# Patient Record
Sex: FEMALE | Race: WHITE | NOT HISPANIC OR LATINO
[De-identification: names, ages, dates, MRNs, and addresses within clinical notes are randomized per-mention and may not be internally consistent; named-entity substitution may affect disease eponyms.]

---

## 2017-02-13 PROBLEM — Z00.00 ENCOUNTER FOR PREVENTIVE HEALTH EXAMINATION: Status: ACTIVE | Noted: 2017-02-13

## 2018-08-28 ENCOUNTER — RESULT REVIEW (OUTPATIENT)
Age: 63
End: 2018-08-28

## 2020-01-15 ENCOUNTER — TRANSCRIPTION ENCOUNTER (OUTPATIENT)
Age: 65
End: 2020-01-15

## 2020-08-13 RX ORDER — AZITHROMYCIN 500 MG/1
0 TABLET, FILM COATED ORAL
Qty: 0 | Refills: 0 | DISCHARGE
Start: 2020-08-13 | End: 2020-08-18

## 2020-08-21 ENCOUNTER — APPOINTMENT (OUTPATIENT)
Dept: CT IMAGING | Facility: CLINIC | Age: 65
End: 2020-08-21
Payer: MEDICARE

## 2020-08-21 ENCOUNTER — TRANSCRIPTION ENCOUNTER (OUTPATIENT)
Age: 65
End: 2020-08-21

## 2020-08-21 ENCOUNTER — OUTPATIENT (OUTPATIENT)
Dept: OUTPATIENT SERVICES | Facility: HOSPITAL | Age: 65
LOS: 1 days | End: 2020-08-21
Payer: MEDICARE

## 2020-08-21 DIAGNOSIS — R22.0 LOCALIZED SWELLING, MASS AND LUMP, HEAD: ICD-10-CM

## 2020-08-21 PROCEDURE — 70487 CT MAXILLOFACIAL W/DYE: CPT | Mod: 26

## 2020-08-21 PROCEDURE — 70487 CT MAXILLOFACIAL W/DYE: CPT

## 2020-08-21 PROCEDURE — 82565 ASSAY OF CREATININE: CPT

## 2020-08-24 ENCOUNTER — INPATIENT (INPATIENT)
Facility: HOSPITAL | Age: 65
LOS: 1 days | Discharge: HOME | DRG: 158 | End: 2020-08-26
Attending: HOSPITALIST | Admitting: HOSPITALIST
Payer: MEDICARE

## 2020-08-24 VITALS
SYSTOLIC BLOOD PRESSURE: 150 MMHG | DIASTOLIC BLOOD PRESSURE: 81 MMHG | RESPIRATION RATE: 18 BRPM | OXYGEN SATURATION: 98 % | HEART RATE: 67 BPM | TEMPERATURE: 98 F

## 2020-08-24 DIAGNOSIS — M27.2 INFLAMMATORY CONDITIONS OF JAWS: ICD-10-CM

## 2020-08-24 DIAGNOSIS — Z88.2 ALLERGY STATUS TO SULFONAMIDES: ICD-10-CM

## 2020-08-24 LAB
ALBUMIN SERPL ELPH-MCNC: 4.1 G/DL — SIGNIFICANT CHANGE UP (ref 3.3–5)
ALP SERPL-CCNC: 91 U/L — SIGNIFICANT CHANGE UP (ref 40–120)
ALT FLD-CCNC: 28 U/L — SIGNIFICANT CHANGE UP (ref 12–78)
ANION GAP SERPL CALC-SCNC: 3 MMOL/L — LOW (ref 5–17)
AST SERPL-CCNC: 20 U/L — SIGNIFICANT CHANGE UP (ref 15–37)
BASOPHILS # BLD AUTO: 0.04 K/UL — SIGNIFICANT CHANGE UP (ref 0–0.2)
BASOPHILS NFR BLD AUTO: 0.5 % — SIGNIFICANT CHANGE UP (ref 0–2)
BILIRUB SERPL-MCNC: 0.3 MG/DL — SIGNIFICANT CHANGE UP (ref 0.2–1.2)
BUN SERPL-MCNC: 21 MG/DL — SIGNIFICANT CHANGE UP (ref 7–23)
CALCIUM SERPL-MCNC: 10.3 MG/DL — HIGH (ref 8.5–10.1)
CHLORIDE SERPL-SCNC: 106 MMOL/L — SIGNIFICANT CHANGE UP (ref 96–108)
CO2 SERPL-SCNC: 29 MMOL/L — SIGNIFICANT CHANGE UP (ref 22–31)
CREAT SERPL-MCNC: 0.83 MG/DL — SIGNIFICANT CHANGE UP (ref 0.5–1.3)
EOSINOPHIL # BLD AUTO: 0.17 K/UL — SIGNIFICANT CHANGE UP (ref 0–0.5)
EOSINOPHIL NFR BLD AUTO: 1.9 % — SIGNIFICANT CHANGE UP (ref 0–6)
ERYTHROCYTE [SEDIMENTATION RATE] IN BLOOD: 27 MM/HR — HIGH (ref 0–20)
GLUCOSE SERPL-MCNC: 101 MG/DL — HIGH (ref 70–99)
HCT VFR BLD CALC: 40.4 % — SIGNIFICANT CHANGE UP (ref 34.5–45)
HGB BLD-MCNC: 12.9 G/DL — SIGNIFICANT CHANGE UP (ref 11.5–15.5)
IMM GRANULOCYTES NFR BLD AUTO: 0.3 % — SIGNIFICANT CHANGE UP (ref 0–1.5)
LACTATE SERPL-SCNC: 0.8 MMOL/L — SIGNIFICANT CHANGE UP (ref 0.7–2)
LYMPHOCYTES # BLD AUTO: 2.7 K/UL — SIGNIFICANT CHANGE UP (ref 1–3.3)
LYMPHOCYTES # BLD AUTO: 30.8 % — SIGNIFICANT CHANGE UP (ref 13–44)
MCHC RBC-ENTMCNC: 28.4 PG — SIGNIFICANT CHANGE UP (ref 27–34)
MCHC RBC-ENTMCNC: 31.9 GM/DL — LOW (ref 32–36)
MCV RBC AUTO: 89 FL — SIGNIFICANT CHANGE UP (ref 80–100)
MONOCYTES # BLD AUTO: 0.59 K/UL — SIGNIFICANT CHANGE UP (ref 0–0.9)
MONOCYTES NFR BLD AUTO: 6.7 % — SIGNIFICANT CHANGE UP (ref 2–14)
NEUTROPHILS # BLD AUTO: 5.24 K/UL — SIGNIFICANT CHANGE UP (ref 1.8–7.4)
NEUTROPHILS NFR BLD AUTO: 59.8 % — SIGNIFICANT CHANGE UP (ref 43–77)
PLATELET # BLD AUTO: 330 K/UL — SIGNIFICANT CHANGE UP (ref 150–400)
POTASSIUM SERPL-MCNC: 4 MMOL/L — SIGNIFICANT CHANGE UP (ref 3.5–5.3)
POTASSIUM SERPL-SCNC: 4 MMOL/L — SIGNIFICANT CHANGE UP (ref 3.5–5.3)
PROT SERPL-MCNC: 8.6 GM/DL — HIGH (ref 6–8.3)
RBC # BLD: 4.54 M/UL — SIGNIFICANT CHANGE UP (ref 3.8–5.2)
RBC # FLD: 13.2 % — SIGNIFICANT CHANGE UP (ref 10.3–14.5)
SARS-COV-2 RNA SPEC QL NAA+PROBE: SIGNIFICANT CHANGE UP
SODIUM SERPL-SCNC: 138 MMOL/L — SIGNIFICANT CHANGE UP (ref 135–145)
WBC # BLD: 8.77 K/UL — SIGNIFICANT CHANGE UP (ref 3.8–10.5)
WBC # FLD AUTO: 8.77 K/UL — SIGNIFICANT CHANGE UP (ref 3.8–10.5)

## 2020-08-24 PROCEDURE — 80053 COMPREHEN METABOLIC PANEL: CPT

## 2020-08-24 PROCEDURE — A9579: CPT

## 2020-08-24 PROCEDURE — 86803 HEPATITIS C AB TEST: CPT

## 2020-08-24 PROCEDURE — 86769 SARS-COV-2 COVID-19 ANTIBODY: CPT

## 2020-08-24 PROCEDURE — 99222 1ST HOSP IP/OBS MODERATE 55: CPT | Mod: GC,AI

## 2020-08-24 PROCEDURE — 84100 ASSAY OF PHOSPHORUS: CPT

## 2020-08-24 PROCEDURE — 87635 SARS-COV-2 COVID-19 AMP PRB: CPT

## 2020-08-24 PROCEDURE — 85027 COMPLETE CBC AUTOMATED: CPT

## 2020-08-24 PROCEDURE — 70543 MRI ORBT/FAC/NCK W/O &W/DYE: CPT

## 2020-08-24 PROCEDURE — 36415 COLL VENOUS BLD VENIPUNCTURE: CPT

## 2020-08-24 RX ORDER — VANCOMYCIN HCL 1 G
1250 VIAL (EA) INTRAVENOUS ONCE
Refills: 0 | Status: COMPLETED | OUTPATIENT
Start: 2020-08-24 | End: 2020-08-24

## 2020-08-24 RX ORDER — ENOXAPARIN SODIUM 100 MG/ML
40 INJECTION SUBCUTANEOUS DAILY
Refills: 0 | Status: DISCONTINUED | OUTPATIENT
Start: 2020-08-24 | End: 2020-08-25

## 2020-08-24 RX ORDER — SODIUM CHLORIDE 9 MG/ML
1000 INJECTION, SOLUTION INTRAVENOUS ONCE
Refills: 0 | Status: COMPLETED | OUTPATIENT
Start: 2020-08-24 | End: 2020-08-24

## 2020-08-24 RX ORDER — ACETAMINOPHEN 500 MG
650 TABLET ORAL EVERY 6 HOURS
Refills: 0 | Status: DISCONTINUED | OUTPATIENT
Start: 2020-08-24 | End: 2020-08-26

## 2020-08-24 RX ORDER — AMLODIPINE BESYLATE 2.5 MG/1
5 TABLET ORAL DAILY
Refills: 0 | Status: DISCONTINUED | OUTPATIENT
Start: 2020-08-24 | End: 2020-08-25

## 2020-08-24 RX ORDER — ESCITALOPRAM OXALATE 10 MG/1
0 TABLET, FILM COATED ORAL
Qty: 0 | Refills: 0 | DISCHARGE

## 2020-08-24 RX ORDER — CEFEPIME 1 G/1
2000 INJECTION, POWDER, FOR SOLUTION INTRAMUSCULAR; INTRAVENOUS EVERY 12 HOURS
Refills: 0 | Status: DISCONTINUED | OUTPATIENT
Start: 2020-08-24 | End: 2020-08-25

## 2020-08-24 RX ORDER — NADOLOL 80 MG/1
0 TABLET ORAL
Qty: 0 | Refills: 0 | DISCHARGE

## 2020-08-24 RX ORDER — CEFEPIME 1 G/1
2000 INJECTION, POWDER, FOR SOLUTION INTRAMUSCULAR; INTRAVENOUS ONCE
Refills: 0 | Status: COMPLETED | OUTPATIENT
Start: 2020-08-24 | End: 2020-08-24

## 2020-08-24 RX ORDER — VANCOMYCIN HCL 1 G
1000 VIAL (EA) INTRAVENOUS ONCE
Refills: 0 | Status: DISCONTINUED | OUTPATIENT
Start: 2020-08-24 | End: 2020-08-24

## 2020-08-24 RX ORDER — VANCOMYCIN HCL 1 G
1000 VIAL (EA) INTRAVENOUS EVERY 12 HOURS
Refills: 0 | Status: DISCONTINUED | OUTPATIENT
Start: 2020-08-24 | End: 2020-08-25

## 2020-08-24 RX ADMIN — SODIUM CHLORIDE 1000 MILLILITER(S): 9 INJECTION, SOLUTION INTRAVENOUS at 19:31

## 2020-08-24 RX ADMIN — Medication 1250 MILLIGRAM(S): at 23:39

## 2020-08-24 RX ADMIN — CEFEPIME 100 MILLIGRAM(S): 1 INJECTION, POWDER, FOR SOLUTION INTRAMUSCULAR; INTRAVENOUS at 19:35

## 2020-08-24 RX ADMIN — SODIUM CHLORIDE 1000 MILLILITER(S): 9 INJECTION, SOLUTION INTRAVENOUS at 23:39

## 2020-08-24 RX ADMIN — Medication 166.67 MILLIGRAM(S): at 20:48

## 2020-08-24 NOTE — ED ADULT NURSE REASSESSMENT NOTE - NS ED NURSE REASSESS COMMENT FT1
Verified with pharmacist and  that Cefepime and vanco is safe administration with pcn allery and can be administered with LR, pt educated and agrees to plan of care at this time.

## 2020-08-24 NOTE — H&P ADULT - ASSESSMENT
66 yo F with hx of HTN presents with c/o left jaw pain x 2 weeks,  in the setting of recent failed dental implants.      #Left jaw pain likely 2/2 mandibular osteomyelitis  -On exam no fluctuance appreciated, firm lump overlying anterior portion of mandibular body  -No chest pain, and given recent failed dental implants, concern for periodontic process causing pain  -No leukocytosis and lactate < 1, ESR 27  -S/p azithromycin (5 day course) and doxycycline (since 8/21/20) as outpatient, however pain persists  -CT mandible w/ IV cont- bone erosion and periosteal reaction within horizontal ramus of left mandible in region of molar extraction socket. Osteomyelitis of prime concern however osteonecrosis or less likely tumor infiltration are possible. Soft tissue fullness within nasopharynx and tongue base likely residual lymphoid tissue.   -s/p LR 1 L, vancomycin 1250 mg x1 and cefepime 2 g x1 in the ED  -Continue cefepime 2g q12h and (gram neg coverage) vancomycin 1g q12h (MRSA coverage)  -Tylenol prn pain  -Consider anaerobic coverage with clindamycin  -f/u Blood cx x2  -MRI facial bones in AM   -OMFS consult  -ID consult for further antibiotic recommendations (pt with PCN and sulfa allergy)    #HTN  -Elevated BP in ED  -Nadolol 20 mg QD  -Monitor VS per routine     #Elevated calcium  -low concern for malignancy although CT mandible with mention of less likely tumor infiltration   -Repeat Ca in AM  -If Ca remains elevated, recommend hypercalcemia work up    #Elevated total protein  -Given elevated calcium and protein is mild, will continue to monitor  -CT Mandible w/ IV cont- osteomyelitis of prime consideration however osteonecrosis or less likely tumor infiltration   -F/u total protein level in Am; if it remains elevated in setting of hypercalcemia consider MM on differential (although low suspicion)    #DVT PPX  -SCD   -Encourage ambulation     IMPROVE VTE Individual Risk Assessment    RISK                                                                Points    [  ] Previous VTE                                                  3    [  ] Thrombophilia                                               2    [  ] Lower limb paralysis                                      2        (unable to hold up >15 seconds)      [  ] Current Cancer                                              2         (within 6 months)    [  ] Immobilization > 24 hrs                                1    [  ] ICU/CCU stay > 24 hours                              1    [ x ] Age > 60                                                      1    IMPROVE VTE Score ____1_____    IMPROVE Score 0-1: Low Risk, No VTE prophylaxis required for most patients, encourage ambulation.   IMPROVE Score 2-3: At risk, pharmacologic VTE prophylaxis is indicated for most patients (in the absence of a contraindication)  IMPROVE Score > or = 4: High Risk, pharmacologic VTE prophylaxis is indicated for most patients (in the absence of a contraindication) 66 yo F with hx of HTN presents with c/o left jaw pain x 2 weeks,  in the setting of recent failed dental implants.      #Left jaw pain likely 2/2 mandibular osteomyelitis  -On exam no fluctuance appreciated, firm lump overlying anterior portion of mandibular body  -No chest pain, and given recent failed dental implants, concern for periodontic process causing pain  -No leukocytosis and lactate < 1, ESR 27  -S/p azithromycin (5 day course) and doxycycline (since 8/21/20) as outpatient, however pain persists  -CT mandible w/ IV cont- bone erosion and periosteal reaction within horizontal ramus of left mandible in region of molar extraction socket. Osteomyelitis of prime concern however osteonecrosis or less likely tumor infiltration are possible. Soft tissue fullness within nasopharynx and tongue base likely residual lymphoid tissue.   -s/p LR 1 L, vancomycin 1250 mg x1 and cefepime 2 g x1 in the ED  -Continue cefepime 2g q12h and (gram neg coverage) vancomycin 1g q12h (MRSA coverage)  -Tylenol prn pain  -Consider anaerobic coverage with clindamycin  -f/u Blood cx x2  -MRI facial bones in AM   -OMFS consult  -ID consult for further antibiotic recommendations (pt with PCN and sulfa allergy)    #HTN  -Elevated BP in ED  -Nadolol 20 mg QD  -Monitor VS per routine     #Elevated calcium  -low concern for malignancy although CT mandible with mention of less likely tumor infiltration   -Repeat Ca in AM  -If Ca remains elevated, recommend hypercalcemia work up    #Elevated total protein  -Given elevated calcium and protein is mild, will continue to monitor  -CT Mandible w/ IV cont- osteomyelitis of prime consideration however osteonecrosis or less likely tumor infiltration   -F/u total protein level in Am; if it remains elevated in setting of hypercalcemia consider MM on differential (although low suspicion)    #DVT PPX  -SCD   -Encourage ambulation     IMPROVE VTE Individual Risk Assessment    RISK                                                                Points    [  ] Previous VTE                                                  3    [  ] Thrombophilia                                               2    [  ] Lower limb paralysis                                      2        (unable to hold up >15 seconds)      [  ] Current Cancer                                              2         (within 6 months)    [  ] Immobilization > 24 hrs                                1    [  ] ICU/CCU stay > 24 hours                              1    [ x ] Age > 60                                                      1    IMPROVE VTE Score ____1_____    IMPROVE Score 0-1: Low Risk, No VTE prophylaxis required for most patients, encourage ambulation.   IMPROVE Score 2-3: At risk, pharmacologic VTE prophylaxis is indicated for most patients (in the absence of a contraindication)  IMPROVE Score > or = 4: High Risk, pharmacologic VTE prophylaxis is indicated for most patients (in the absence of a contraindication)    #CODE Status  -Full code

## 2020-08-24 NOTE — ED PROVIDER NOTE - PROGRESS NOTE DETAILS
Renaldo PGY-3:  D/W Dr. Castrejon, agrees with IV abx and labs obtained here in ED, states can be D/C and F/U tomorrow in AM for evaluation and if needs admission will admit from clinic at that time (pt to call [phone] to make appt) Renaldo PGY-3:  D/W Dr. Castrejon, agrees with IV abx and labs obtained here in ED, states pt may need    admission, will evaluate in outpatient setting and is ok w/ pt being d/c to f/u o/p for this evaluation Renaldo PGY-3:  Pt needs IV abx, MRI and OMFS to evaluate, will admit for further care as unclear if PO abx will be efficacious if discharged pt seen and examined in intake.  sent to ED for treatment of jaw osteo.  on exam, mild TTP to L lower jaw.  no gingival flucutaance or drainage.  no swelling to floor of mouth.  no LAD.  will admit for IV abx and further care.  MD Matt

## 2020-08-24 NOTE — ED ADULT NURSE REASSESSMENT NOTE - NS ED NURSE REASSESS COMMENT FT1
Dr. Dias notified regarding pt's BP. Pt refusing amlodipine bc she is nervous she will have a reaction to it. Pt requesting Nadaolol, which she takes at home. Pharmacy confirmed we do carry Nadaolol. Orders placed, will administer to pt when med comes from pharmacy.

## 2020-08-24 NOTE — H&P ADULT - NSHPPHYSICALEXAM_GEN_ALL_CORE
Vital Signs Last 24 Hrs  T(C): 36.8 (24 Aug 2020 23:00), Max: 37 (24 Aug 2020 19:22)  T(F): 98.2 (24 Aug 2020 23:00), Max: 98.6 (24 Aug 2020 19:22)  HR: 69 (24 Aug 2020 23:00) (59 - 69)  BP: 170/80 (24 Aug 2020 23:00) (150/81 - 185/78)  BP(mean): 95 (24 Aug 2020 23:00) (95 - 96)  RR: 18 (24 Aug 2020 23:00) (18 - 18)  SpO2: 100% (24 Aug 2020 23:00) (98% - 100%)

## 2020-08-24 NOTE — H&P ADULT - HISTORY OF PRESENT ILLNESS
66 yo F with hx of HTN presents with c/o left jaw pain x 2 weeks. Patient reports pain in left jaw is non radiating, constant, worse with palpation, and no known alleviating factors. She reports jaw pain is associated with jaw swelling, which has now improved after a few course of antibiotics and sinus headache for the past 2 weeks. Patient denies recent travel, fevers, chills, chest pain, SOB and unintentional weight loss. She does report recent dental implant (7/2020) which failed on 3 attempts, after which patient experienced left jaw numbness. Given hx of teeth extractions on the left, patient states she does not chew on the left side.   She states she went to her periodontist last week and was given 5 day course of azithromycin which she completed last Wednesday, 8/19/2020. After completing this course, pt went to Urgent Care on 8/21/2020 since pain and swelling persisted, and was given doxycyline and CT scan was completed. Urgent care followed up with patient today and recommended patient come to the ED regarding CT scan findings with concern for mandibular osteomyelitis.    CT scan mandible w/ IV cont- bone erosion and periosteal reaction within horizontal ramus of left mandible in region of molar extraction socket. Osteomyelitis of prime concern however osteonecrosis or less likely tumor infiltration are possible. Soft tissue fullness within nasopharynx and tongue base likely residual lymphoid tissue.     In the ED, lactate 0.8 , ESR 27 and patient was given LR 1 L, vancomycin 1250 mg x1 and cefepime 2 g x1 66 yo F with hx of HTN presents with c/o left jaw pain x 2 weeks. Patient reports pain in left jaw is non radiating, constant, worse with palpation. Pt states she was using motrin and tylenol (2 tabs 500 mg) for pain control at home. Pt reports pain was initially 9/10 on arrival and improved to 3/10 since in the ED. She reports jaw pain is associated with jaw swelling, which has now improved after a few course of antibiotics and sinus headache for the past 2 weeks. Patient denies recent travel, fevers, chills, chest pain, SOB and unintentional weight loss. She does report recent dental implant (7/2020) which failed on 3 attempts, after which patient experienced left jaw numbness. Given hx of teeth extractions on the left, patient states she does not chew on the left side.   She states she went to her periodontist last week and was given 5 day course of azithromycin which she completed last Wednesday, 8/19/2020. After completing this course, pt went to Urgent Care on 8/21/2020 since pain and swelling persisted, and was given doxycyline and CT scan was completed. Urgent care followed up with patient today and recommended patient come to the ED regarding CT scan findings with concern for mandibular osteomyelitis.    CT scan mandible w/ IV cont- bone erosion and periosteal reaction within horizontal ramus of left mandible in region of molar extraction socket. Osteomyelitis of prime concern however osteonecrosis or less likely tumor infiltration are possible. Soft tissue fullness within nasopharynx and tongue base likely residual lymphoid tissue.     In the ED, lactate 0.8 , ESR 27 and patient was given LR 1 L, vancomycin 1250 mg x1 and cefepime 2 g x1

## 2020-08-24 NOTE — ED PROVIDER NOTE - PHYSICAL EXAMINATION
General: NAD  HEENT: pupils equal and reactive, normal external ears bilaterally , L lower mandible TTP and with swelling, no drainage or erythema noted in oropharynx   Cardiac: RRR, no MRG appreciated  Resp: lungs clear to auscultation bilaterally, symmetric chest wall rise  Abd: soft, nontender, nondistended,   : no CVA tenderness  Neuro: Moving all extremities  Skin:  normal color for race

## 2020-08-24 NOTE — ED PROVIDER NOTE - ATTENDING CONTRIBUTION TO CARE
I, Kelly Vo MD,  performed the initial face to face bedside interview with this patient regarding history of present illness, review of symptoms and relevant past medical, social and family history.  I completed an independent physical examination.  I was the initial provider who evaluated this patient. I have signed out the follow up of any pending tests (i.e. labs, radiological studies) to the resident.  I have communicated the patient’s plan of care and disposition with the resident.

## 2020-08-24 NOTE — H&P ADULT - ENMT COMMENTS
+ b/l maxillary sinus tenderness, + tenderness with palpation at the base and anterior portion of the left mandibular body , + swelling and firm lump of the base of the left mandible, no erythema

## 2020-08-24 NOTE — H&P ADULT - NSHPREVIEWOFSYSTEMS_GEN_ALL_CORE
Review of Systems:  CONSTITUTIONAL: No weakness, fevers or chills  EYES/ENT: No visual changes;  No vertigo or throat pain; + sinus headache; + left jaw pain   NECK: No pain or stiffness  RESPIRATORY: No cough, wheezing, hemoptysis; No shortness of breath,   CARDIOVASCULAR: No chest pain or palpitations  GASTROINTESTINAL: No abdominal or epigastric pain. No nausea, vomiting, or hematemesis; No diarrhea or constipation.   GENITOURINARY: No dysuria, frequency or hematuria  NEUROLOGICAL: No numbness or weakness  SKIN: No itching, burning, rashes, or lesions   All other review of systems is negative unless indicated above

## 2020-08-24 NOTE — H&P ADULT - ATTENDING COMMENTS
65 year old female patient with narrative as previously stated      -Admit to Medsurg      #Jaw pain  -DDX: osteomyelitis, osteonecrosis  -get morning MRI  -on vanco and cefepime  -get ID consult in light of likely osteo and possible need for long term IV abx    #HTN  -on nadalol    #Advanced directives  -full code    #DVT ppx  -encourage ambulation

## 2020-08-24 NOTE — ED PROVIDER NOTE - NS ED ROS FT
CONSTITUTIONAL: No fevers, no chills  Eyes: No vision changes  Cardiovascular: No Chest pain  Respiratory: No SOB  Gastrointestinal: No n/v/d, no abd pain  SKIN: no rashes.  PSYCHIATRIC: no known mental health issues.  Endocrine: No unexplained weight gain

## 2020-08-24 NOTE — ED PROVIDER NOTE - CLINICAL SUMMARY MEDICAL DECISION MAKING FREE TEXT BOX
Renaldo PGY-3:  66yo F pmhx HTN here with jaw pain found to be ostemyelitis on CT scan w/ iv contrast, will cover osteo with IV abx, consult OMFS, bloodwork and reassess

## 2020-08-24 NOTE — ED PROVIDER NOTE - OBJECTIVE STATEMENT
64yo F pmhx HTN here with cc of jaw pain    states has had issues with L jaw for years, now with two weeks of L lower mandible swelling and pain. Went to  on 8/21 who Rx CT scan of jaw and doxycycline, CT scan resulted showing osteomyelitis of jaw. Denies F/C, pain in affected area and with moving mouth, otherwise no SOB no pain elsewhere no generalized weakness. Has had two teeth implanted into area of pain and both have fallen out.   Allergies: Penicillin as a kid, sulfa drugs

## 2020-08-25 ENCOUNTER — TRANSCRIPTION ENCOUNTER (OUTPATIENT)
Age: 65
End: 2020-08-25

## 2020-08-25 DIAGNOSIS — Z90.49 ACQUIRED ABSENCE OF OTHER SPECIFIED PARTS OF DIGESTIVE TRACT: Chronic | ICD-10-CM

## 2020-08-25 LAB
ALBUMIN SERPL ELPH-MCNC: 3.5 G/DL — SIGNIFICANT CHANGE UP (ref 3.3–5)
ALP SERPL-CCNC: 87 U/L — SIGNIFICANT CHANGE UP (ref 40–120)
ALT FLD-CCNC: 31 U/L — SIGNIFICANT CHANGE UP (ref 12–78)
ANION GAP SERPL CALC-SCNC: 5 MMOL/L — SIGNIFICANT CHANGE UP (ref 5–17)
AST SERPL-CCNC: 24 U/L — SIGNIFICANT CHANGE UP (ref 15–37)
BILIRUB SERPL-MCNC: 0.9 MG/DL — SIGNIFICANT CHANGE UP (ref 0.2–1.2)
BUN SERPL-MCNC: 18 MG/DL — SIGNIFICANT CHANGE UP (ref 7–23)
CALCIUM SERPL-MCNC: 9.6 MG/DL — SIGNIFICANT CHANGE UP (ref 8.5–10.1)
CHLORIDE SERPL-SCNC: 108 MMOL/L — SIGNIFICANT CHANGE UP (ref 96–108)
CO2 SERPL-SCNC: 28 MMOL/L — SIGNIFICANT CHANGE UP (ref 22–31)
CREAT SERPL-MCNC: 0.67 MG/DL — SIGNIFICANT CHANGE UP (ref 0.5–1.3)
CRP SERPL-MCNC: 0.54 MG/DL — HIGH (ref 0–0.4)
GLUCOSE SERPL-MCNC: 107 MG/DL — HIGH (ref 70–99)
HCV AB S/CO SERPL IA: 0.05 S/CO — SIGNIFICANT CHANGE UP (ref 0–0.99)
HCV AB SERPL-IMP: SIGNIFICANT CHANGE UP
PHOSPHATE SERPL-MCNC: 3.1 MG/DL — SIGNIFICANT CHANGE UP (ref 2.5–4.5)
POTASSIUM SERPL-MCNC: 4 MMOL/L — SIGNIFICANT CHANGE UP (ref 3.5–5.3)
POTASSIUM SERPL-SCNC: 4 MMOL/L — SIGNIFICANT CHANGE UP (ref 3.5–5.3)
PROT SERPL-MCNC: 7.9 GM/DL — SIGNIFICANT CHANGE UP (ref 6–8.3)
SODIUM SERPL-SCNC: 141 MMOL/L — SIGNIFICANT CHANGE UP (ref 135–145)

## 2020-08-25 PROCEDURE — 70543 MRI ORBT/FAC/NCK W/O &W/DYE: CPT | Mod: 26

## 2020-08-25 PROCEDURE — 99233 SBSQ HOSP IP/OBS HIGH 50: CPT | Mod: GC

## 2020-08-25 RX ORDER — VITAMIN E 100 UNIT
1 CAPSULE ORAL
Qty: 0 | Refills: 0 | DISCHARGE

## 2020-08-25 RX ORDER — NADOLOL 80 MG/1
1 TABLET ORAL
Qty: 0 | Refills: 0 | DISCHARGE

## 2020-08-25 RX ORDER — LACTOBACILLUS ACIDOPHILUS 100MM CELL
1 CAPSULE ORAL DAILY
Refills: 0 | Status: DISCONTINUED | OUTPATIENT
Start: 2020-08-25 | End: 2020-08-26

## 2020-08-25 RX ORDER — SODIUM CHLORIDE 9 MG/ML
1000 INJECTION INTRAMUSCULAR; INTRAVENOUS; SUBCUTANEOUS
Refills: 0 | Status: DISCONTINUED | OUTPATIENT
Start: 2020-08-25 | End: 2020-08-26

## 2020-08-25 RX ORDER — ESCITALOPRAM OXALATE 10 MG/1
10 TABLET, FILM COATED ORAL DAILY
Refills: 0 | Status: DISCONTINUED | OUTPATIENT
Start: 2020-08-25 | End: 2020-08-26

## 2020-08-25 RX ORDER — NADOLOL 80 MG/1
2 TABLET ORAL
Qty: 0 | Refills: 0 | DISCHARGE

## 2020-08-25 RX ORDER — CHOLECALCIFEROL (VITAMIN D3) 125 MCG
1 CAPSULE ORAL
Qty: 0 | Refills: 0 | DISCHARGE

## 2020-08-25 RX ORDER — NADOLOL 80 MG/1
20 TABLET ORAL DAILY
Refills: 0 | Status: DISCONTINUED | OUTPATIENT
Start: 2020-08-25 | End: 2020-08-26

## 2020-08-25 RX ORDER — ASCORBIC ACID 60 MG
1 TABLET,CHEWABLE ORAL
Qty: 0 | Refills: 0 | DISCHARGE

## 2020-08-25 RX ADMIN — Medication 100 MILLIGRAM(S): at 14:01

## 2020-08-25 RX ADMIN — Medication 650 MILLIGRAM(S): at 01:58

## 2020-08-25 RX ADMIN — Medication 100 MILLIGRAM(S): at 21:52

## 2020-08-25 RX ADMIN — Medication 650 MILLIGRAM(S): at 04:16

## 2020-08-25 RX ADMIN — SODIUM CHLORIDE 100 MILLILITER(S): 9 INJECTION INTRAMUSCULAR; INTRAVENOUS; SUBCUTANEOUS at 06:38

## 2020-08-25 RX ADMIN — Medication 1 TABLET(S): at 14:27

## 2020-08-25 RX ADMIN — CEFEPIME 100 MILLIGRAM(S): 1 INJECTION, POWDER, FOR SOLUTION INTRAMUSCULAR; INTRAVENOUS at 08:20

## 2020-08-25 RX ADMIN — NADOLOL 20 MILLIGRAM(S): 80 TABLET ORAL at 21:51

## 2020-08-25 RX ADMIN — Medication 650 MILLIGRAM(S): at 22:32

## 2020-08-25 RX ADMIN — ESCITALOPRAM OXALATE 10 MILLIGRAM(S): 10 TABLET, FILM COATED ORAL at 21:51

## 2020-08-25 RX ADMIN — NADOLOL 20 MILLIGRAM(S): 80 TABLET ORAL at 00:18

## 2020-08-25 RX ADMIN — Medication 250 MILLIGRAM(S): at 10:42

## 2020-08-25 RX ADMIN — Medication 650 MILLIGRAM(S): at 21:51

## 2020-08-25 NOTE — DISCHARGE NOTE PROVIDER - CARE PROVIDER_API CALL
Shante Estrada  ORAL/MAXILLOFACIAL SURGERY  [address], [address]  [place] [address]  Phone: [phone]  Fax: [phone]  Follow Up Time: 1-3 days    Karson Daniels  INTERNAL MEDICINE  [address], [address]  [place] [address]  Phone: [phone]  Fax: [phone]  Follow Up Time: 1 week

## 2020-08-25 NOTE — DISCHARGE NOTE PROVIDER - NSDCCPCAREPLAN_GEN_ALL_CORE_FT
PRINCIPAL DISCHARGE DIAGNOSIS  Diagnosis: Osteomyelitis of mandible  Assessment and Plan of Treatment: You were admitted and treated for possible osteomyelitis of the mandible which is an infection of the bone. However, further discussion revealed that swelling and pain was most likely secondary to failed dental implants. You are to follow-up with OralMaxillofacial surgery in the outpatient setting for debridement of site.

## 2020-08-25 NOTE — CONSULT NOTE ADULT - SUBJECTIVE AND OBJECTIVE BOX
Patient is a 65y old  Female who presents with a chief complaint of left jaw pain     HPI:  64 y/o Female with h/o HTN presents was admitted on 8/25 for left jaw pain x 2 weeks. Patient reports pain in left jaw is non radiating, constant, worse with palpation. Pt states she was using motrin and tylenol (2 tabs 500 mg) for pain control at home. She reports jaw pain is associated with jaw swelling, which has now improved after a course of antibiotics for the past 2 weeks. She states she went to her periodontist last week and was given 5 day course of azithromycin which she completed last Wednesday, 8/19/2020. After completing this course, pt went to Urgent Care on 8/21/2020 since pain and swelling persisted, and was given doxycyline and CT scan was completed. Urgent care followed up with patient today and recommended patient come to the ED regarding CT scan findings with concern for mandibular osteomyelitis. Patient denies recent travel, fevers, chills. She does report recent dental implant (7/2020) which failed on 3 attempts, after which patient experienced left jaw numbness. Given hx of teeth extractions on the left, patient states she does not chew on the left side. In ER he received vancomycin 1250 mg x1 and cefepime 2 g x1.      PMH: as above  PSH: as above  Meds: per reconciliation sheet, noted below  MEDICATIONS  (STANDING):  cefepime   IVPB 2000 milliGRAM(s) IV Intermittent every 12 hours  escitalopram 10 milliGRAM(s) Oral daily  nadolol 20 milliGRAM(s) Oral daily  sodium chloride 0.9%. 1000 milliLiter(s) (100 mL/Hr) IV Continuous <Continuous>  vancomycin  IVPB 1000 milliGRAM(s) IV Intermittent every 12 hours    MEDICATIONS  (PRN):  acetaminophen   Tablet .. 650 milliGRAM(s) Oral every 6 hours PRN Mild Pain (1 - 3)    Allergies    penicillin (Rash)  sulfa drugs (Seizure)    Intolerances      Social: no smoking, no alcohol, no illegal drugs; no recent travel, no exposure to TB  FAMILY HISTORY:    no history of premature cardiovascular disease in first degree relatives    ROS: the patient denies fever, no chills, no HA, no seizures, no dizziness, no sore throat, no nasal congestion, no blurry vision, no CP, no palpitations, no SOB, no cough, no abdominal pain, no diarrhea, no N/V, no dysuria, no leg pain, no claudication, no rash, no joint aches, no rectal pain or bleeding, no night sweats; has left jaw pain  All other systems reviewed and are negative    Vital Signs Last 24 Hrs  T(C): 36.8 (25 Aug 2020 08:42), Max: 37 (24 Aug 2020 19:22)  T(F): 98.2 (25 Aug 2020 08:42), Max: 98.6 (24 Aug 2020 19:22)  HR: 67 (25 Aug 2020 08:42) (59 - 69)  BP: 119/48 (25 Aug 2020 08:42) (119/48 - 185/78)  BP(mean): 96 (25 Aug 2020 00:27) (95 - 96)  RR: 18 (25 Aug 2020 08:42) (18 - 18)  SpO2: 97% (25 Aug 2020 08:42) (97% - 100%)  Daily     Daily     PE:    Constitutional:  No acute distress  HEENT: NC/AT, EOMI, PERRLA, conjunctivae clear; ears and nose atraumatic; pharynx benign; left mandibual tenderness  Neck: supple; thyroid not palpable  Back: no tenderness  Respiratory: respiratory effort normal; clear to auscultation  Cardiovascular: S1S2 regular, no murmurs  Abdomen: soft, not tender, not distended, positive BS; no liver or spleen organomegaly  Genitourinary: no suprapubic tenderness  Lymphatic: no LN palpable  Musculoskeletal: no muscle tenderness, no joint swelling or tenderness  Extremities: no pedal edema  Neurological/ Psychiatric: AxOx3, judgement and insight normal; moving all extremities  Skin: no rashes; no palpable lesions    Labs: all available labs reviewed                        12.9   8.77  )-----------( 330      ( 24 Aug 2020 18:41 )             40.4     08-24    138  |  106  |  21  ----------------------------<  101<H>  4.0   |  29  |  0.83    Ca    10.3<H>      24 Aug 2020 18:41    TPro  8.6<H>  /  Alb  4.1  /  TBili  0.3  /  DBili  x   /  AST  20  /  ALT  28  /  AlkPhos  91  08-24     LIVER FUNCTIONS - ( 24 Aug 2020 18:41 )  Alb: 4.1 g/dL / Pro: 8.6 gm/dL / ALK PHOS: 91 U/L / ALT: 28 U/L / AST: 20 U/L / GGT: x             COVID-19 PCR: NotDetec (08-24-20 @ 21:58)    Radiology: all available radiological tests reviewed    < from: CT Mandible w/ IV Cont (08.21.20 @ 15:53) >  Bone erosion and periosteal reaction within the horizontal ramus of the mandible on the left in the region of an unhealed molar extraction socket. Osteomyelitis is a prime consideration however osteonecrosis or less likely tumor infiltration are possibilities.  Soft tissue fullness within the nasopharynx and tongue base likely representing residual lymphoid tissue.     < end of copied text >      Advanced directives addressed: full resuscitation

## 2020-08-25 NOTE — DISCHARGE NOTE PROVIDER - PROVIDER TOKENS
PROVIDER:[TOKEN:[[id number]],FOLLOWUP:[1-3 days]],PROVIDER:[TOKEN:[[id number]],FOLLOWUP:[1 week]]

## 2020-08-25 NOTE — DISCHARGE NOTE PROVIDER - NSDCMRMEDTOKEN_GEN_ALL_CORE_FT
escitalopram 10 mg oral tablet: 1 tab(s) orally once a day  nadolol 20 mg oral tablet: 1 tab(s) orally once a day (at bedtime) clindamycin 300 mg oral capsule: 1 cap(s) orally every 6 hours   escitalopram 10 mg oral tablet: 1 tab(s) orally once a day  nadolol 20 mg oral tablet: 1 tab(s) orally once a day (at bedtime)

## 2020-08-25 NOTE — DISCHARGE NOTE PROVIDER - HOSPITAL COURSE
66 yo F with PMH of HTN presented to ED on 08/24/20 c/o left jaw pain associated w swelling. She reported recent dental implant (7/2020) which failed on 3 attempts, after which she experienced left jaw numbness. She went to her periodontist the week prior to arrival and was placed on a 5 day course of azithromycin which she completed on 8/19/2020. After completing this course, pt went to Urgent Care on 8/21/2020 and was given doxycyline and CT scan was completed. Urgent care followed up with patient and recommended patient come to the ED regarding CT scan findings with concern for mandibular osteomyelitis. CT scan mandible w/ IV cont- bone erosion and periosteal reaction within horizontal ramus of left mandible in region of molar extraction socket. Patient was admitted and treated for possible osteomyelitis of jaw, however, OMFS believes bone defect to be due to failed implant. Patient is febrile and has no signs of infection, patient is medically cleared to be discharged home. Patient received IV clindamycin and will be discharged home on oral antibiotics. Patient will follow-up w OMFS as outpatient for debridement of site. 66 yo F with PMH of HTN presented to ED on 08/24/20 c/o left jaw pain associated w swelling. She reported recent dental implant (7/2020) which failed on 3 attempts, after which she experienced left jaw numbness. She went to her periodontist the week prior to arrival and was placed on a 5 day course of azithromycin which she completed on 8/19/2020. After completing this course, pt went to Urgent Care on 8/21/2020 and was given doxycyline and CT scan was completed. Urgent care followed up with patient and recommended patient come to the ED regarding CT scan findings with concern for mandibular osteomyelitis. CT scan mandible w/ IV cont- bone erosion and periosteal reaction within horizontal ramus of left mandible in region of molar extraction socket. Patient was admitted and treated for possible osteomyelitis of jaw, however, OMFS believes bone defect to be due to failed implant. Patient is afebrile and has no signs of infection, patient is medically cleared to be discharged home. Patient received IV clindamycin and will be discharged home on oral clindamycin for 10 days. Patient will follow-up w OMFS as outpatient for debridement of site.

## 2020-08-25 NOTE — CONSULT NOTE ADULT - ASSESSMENT
66 y/o Female with h/o HTN presents was admitted on 8/25 for left jaw pain x 2 weeks. Patient reports pain in left jaw is non radiating, constant, worse with palpation. Pt states she was using motrin and tylenol (2 tabs 500 mg) for pain control at home. She reports jaw pain is associated with jaw swelling, which has now improved after a course of antibiotics for the past 2 weeks. She states she went to her periodontist last week and was given 5 day course of azithromycin which she completed last Wednesday, 8/19/2020. After completing this course, pt went to Urgent Care on 8/21/2020 since pain and swelling persisted, and was given doxycyline and CT scan was completed. Urgent care followed up with patient today and recommended patient come to the ED regarding CT scan findings with concern for mandibular osteomyelitis. Patient denies recent travel, fevers, chills. She does report recent dental implant (7/2020) which failed on 3 attempts, after which patient experienced left jaw numbness. Given hx of teeth extractions on the left, patient states she does not chew on the left side. In ER he received vancomycin 1250 mg x1 and cefepime 2 g x1.    1. Left jaw pain. Left mandible horizontal ramus bone erosion around unhealed molar extraction socket. Left facial cellulitis. Allergy to PCN.  -possible underlying OM; the patient had recent failed dental implant  -?dental apical abscess/ persistent infection  -she received azithromycin and doxycycline as outpatient  -start clindamycin 900 mg IV q8h  -reason for abx use and side effects reviewed with patient; monitor BMP   -maxillofacial surgery evaluation  -old chart reviewed to assess prior cultures  -monitor temps  -f/u CBC  -supportive care  2. Other issues:   -care per medicine

## 2020-08-26 ENCOUNTER — TRANSCRIPTION ENCOUNTER (OUTPATIENT)
Age: 65
End: 2020-08-26

## 2020-08-26 VITALS
DIASTOLIC BLOOD PRESSURE: 61 MMHG | HEART RATE: 58 BPM | RESPIRATION RATE: 17 BRPM | SYSTOLIC BLOOD PRESSURE: 121 MMHG | TEMPERATURE: 98 F | OXYGEN SATURATION: 99 %

## 2020-08-26 LAB
HCT VFR BLD CALC: 40.2 % — SIGNIFICANT CHANGE UP (ref 34.5–45)
HGB BLD-MCNC: 12.4 G/DL — SIGNIFICANT CHANGE UP (ref 11.5–15.5)
MCHC RBC-ENTMCNC: 28 PG — SIGNIFICANT CHANGE UP (ref 27–34)
MCHC RBC-ENTMCNC: 30.8 GM/DL — LOW (ref 32–36)
MCV RBC AUTO: 90.7 FL — SIGNIFICANT CHANGE UP (ref 80–100)
PLATELET # BLD AUTO: 284 K/UL — SIGNIFICANT CHANGE UP (ref 150–400)
RBC # BLD: 4.43 M/UL — SIGNIFICANT CHANGE UP (ref 3.8–5.2)
RBC # FLD: 13.6 % — SIGNIFICANT CHANGE UP (ref 10.3–14.5)
SARS-COV-2 IGG SERPL QL IA: NEGATIVE — SIGNIFICANT CHANGE UP
SARS-COV-2 IGM SERPL IA-ACNC: <0.1 INDEX — SIGNIFICANT CHANGE UP
WBC # BLD: 4.71 K/UL — SIGNIFICANT CHANGE UP (ref 3.8–10.5)
WBC # FLD AUTO: 4.71 K/UL — SIGNIFICANT CHANGE UP (ref 3.8–10.5)

## 2020-08-26 PROCEDURE — 99239 HOSP IP/OBS DSCHRG MGMT >30: CPT

## 2020-08-26 RX ADMIN — Medication 1 TABLET(S): at 10:19

## 2020-08-26 RX ADMIN — Medication 30 MILLILITER(S): at 00:56

## 2020-08-26 RX ADMIN — Medication 100 MILLIGRAM(S): at 06:21

## 2020-08-26 NOTE — CDI QUERY NOTE - NSCDIOTHERTXTBX_GEN_ALL_CORE_HH
Documentation on chart:  She reports recent dental implant (7/2020) - Osteonecrosis  Per OMFS: most likely failed implant  as source of bone defect in the CT scan, unlikely osteomyelitis.     ID documentation: Left jaw pain. Left mandible horizontal ramus bone erosion around unhealed molar extraction socket. Left facial cellulitis.    Please document the relationship between the following conditions:  A) Facial Cellulitis ( present on admission)  is due to/related to dental implant (7/2020)  B) Osteonecrosis ( present on admission) is due to/related to dental implant (7/2020)  C) Jaw pain/bone defect ( present on admission)  is due to/related to dental implant( 7/2020)   D) Unable to determine  E) Other ( Please specify condition)

## 2020-08-26 NOTE — PROGRESS NOTE ADULT - ASSESSMENT
Pt has been seen and examined with FP resident, resident supervised agree with a/p       Patient is a 65y old  Female who presents with a chief complaint of left jaw pain (25 Aug 2020 11:21)          PHYSICAL EXAM:  Vital Signs Last 24 Hrs  T(C): 36.8 (25 Aug 2020 08:42), Max: 37 (24 Aug 2020 19:22)  T(F): 98.2 (25 Aug 2020 08:42), Max: 98.6 (24 Aug 2020 19:22)  HR: 67 (25 Aug 2020 08:42) (59 - 69)  BP: 119/48 (25 Aug 2020 08:42) (119/48 - 185/78)  BP(mean): 96 (25 Aug 2020 00:27) (95 - 96)  RR: 18 (25 Aug 2020 08:42) (18 - 18)  SpO2: 97% (25 Aug 2020 08:42) (97% - 100%)  -rs-aeeb, cta  -cvs-s1s2 normal   -p/a-soft,bs+      A/P    #ct iv abx, supportive care     #dvt pr
Pt has been seen and examined with FP resident, resident supervised agree with a/p       Patient is a 65y old  Female who presents with a chief complaint of left jaw pain (26 Aug 2020 08:56)          PHYSICAL EXAM:  Vital Signs Last 24 Hrs  T(C): 36.5 (26 Aug 2020 07:53), Max: 37.1 (25 Aug 2020 15:36)  T(F): 97.7 (26 Aug 2020 07:53), Max: 98.7 (25 Aug 2020 15:36)  HR: 58 (26 Aug 2020 07:53) (58 - 65)  BP: 121/61 (26 Aug 2020 07:53) (105/37 - 137/47)  BP(mean): --  RR: 17 (26 Aug 2020 07:53) (17 - 18)  SpO2: 99% (26 Aug 2020 07:53) (96% - 99%)  -rs-aeeb, cta  -cvs-s1s2 normal   -p/a-soft,bs+      A/P    #d/c today with further management as an outpt, time spent 45 minutes     #Facial Cellulitis ( present on admission)  is due to/related to dental implant
66 y/o Female with h/o HTN presents was admitted on 8/25 for left jaw pain x 2 weeks. Patient reports pain in left jaw is non radiating, constant, worse with palpation. Pt states she was using motrin and tylenol (2 tabs 500 mg) for pain control at home. She reports jaw pain is associated with jaw swelling, which has now improved after a course of antibiotics for the past 2 weeks. She states she went to her periodontist last week and was given 5 day course of azithromycin which she completed last Wednesday, 8/19/2020. After completing this course, pt went to Urgent Care on 8/21/2020 since pain and swelling persisted, and was given doxycyline and CT scan was completed. Urgent care followed up with patient today and recommended patient come to the ED regarding CT scan findings with concern for mandibular osteomyelitis. Patient denies recent travel, fevers, chills. She does report recent dental implant (7/2020) which failed on 3 attempts, after which patient experienced left jaw numbness. Given hx of teeth extractions on the left, patient states she does not chew on the left side. In ER he received vancomycin 1250 mg x1 and cefepime 2 g x1.    1. Left jaw pain. Left mandible horizontal ramus bone erosion around unhealed molar extraction socket. Left facial cellulitis. Allergy to PCN.  -possible underlying OM; the patient had recent failed dental implant  -?dental apical abscess/ persistent infection  -she received azithromycin and doxycycline as outpatient  -on clindamycin 900 mg IV q8h # 2  -tolerating abx well so far; no side effects noted  -may change abx to clindamycin 300 mg PO q6h for 10-14 more days  -f/u with dental as outpatient to determine abx therapy duration and further treatment  -reason for abx use and side effects reviewed with patient; monitor BMP   -maxillofacial surgery evaluation appreciated  -monitor temps  -f/u CBC  -supportive care  2. Other issues:   -care per medicine
66 yo F with PMH of HTN presents to ED on 08/24/20 c/o left jaw pain associated w swelling. She reports recent dental implant (7/2020) which failed on 3 attempts, after which she experienced left jaw numbness. She went to her periodontist the week prior to arrival and was placed on a 5 day course of azithromycin which she completed on 8/19/2020. After completing this course, pt went to Urgent Care on 8/21/2020 and was given doxycyline and CT scan was completed. Urgent care followed up with patient and recommended patient come to the ED regarding CT scan findings with concern for mandibular osteomyelitis. CT scan mandible w/ IV cont- bone erosion and periosteal reaction within horizontal ramus of left mandible in region of molar extraction socket.     #Left mandibular pain  Pain resolved, no leukocytosis, pt afebrile, osteomyelitis unlikely  -Per ID recs: started on clindamycin 900 mg IV q8h, will prob be D/C on PO clindamycin  -Per OMFS: most likely failed implant  as source of bone defect in the CT scan, unlikely osteomyelitis. Give one more day of IV antibiotics and D/C home tomorrow on PO Clindamycin. Will see as an outpatient on Thursday and recommend to debride site. OMFS Dr. Elmore [phone]  -Tylenol prn pain  -F/U Blood cx x2  -F/U AM CBC     #HTN  Well controlled, now 119/48  -Nadolol 20 mg QD  -Monitor VS per routine     #Elevated calcium  Now resolved, Ca today 9.6    #Elevated total protein  Now resolved, protein tdaoy 7.9    #DVT PPX  -SCD   -Pt ambulating around room    IMPROVE VTE Individual Risk Assessment    RISK                                                                Points    [  ] Previous VTE                                                  3    [  ] Thrombophilia                                               2    [  ] Lower limb paralysis                                      2        (unable to hold up >15 seconds)      [  ] Current Cancer                                              2         (within 6 months)    [  ] Immobilization > 24 hrs                                1    [  ] ICU/CCU stay > 24 hours                              1    [ x ] Age > 60                                                      1    IMPROVE VTE Score ____1_____    IMPROVE Score 0-1: Low Risk, No VTE prophylaxis required for most patients, encourage ambulation.   IMPROVE Score 2-3: At risk, pharmacologic VTE prophylaxis is indicated for most patients (in the absence of a contraindication)  IMPROVE Score > or = 4: High Risk, pharmacologic VTE prophylaxis is indicated for most patients (in the absence of a contraindication)    #CODE Status  -Full code    #D/C Planning  Possible D/C home tomorrow, will F/U w OMFS as outpt    Discussed w Dr. Ford
66 yo F with PMH of HTN presents to ED on 08/24/20 c/o left jaw pain associated w swelling. She reports recent dental implant (7/2020) which failed on 3 attempts, after which she experienced left jaw numbness. She went to her periodontist the week prior to arrival and was placed on a 5 day course of azithromycin which she completed on 8/19/2020. After completing this course, pt went to Urgent Care on 8/21/2020 and was given doxycyline and CT scan was completed. Urgent care followed up with patient and recommended patient come to the ED regarding CT scan findings with concern for mandibular osteomyelitis. CT scan mandible w/ IV cont- bone erosion and periosteal reaction within horizontal ramus of left mandible in region of molar extraction socket. Admitted and treated for cellulitis & mandible pain/bone erosion secondary to failed dental implants. Patient medically cleared to be discharged home.      #Facial cellulitis & jaw pain/bone defect present on admission due to failed dental implant  Pain resolved, no leukocytosis, pt afebrile, osteomyelitis unlikely  -Per ID recs: started on clindamycin 900 mg IV q8h, completed 2 days. Will be discharged home on 10 days of 300mg clindamycin oral q6H.   -Per OMFS: most likely failed implant  as source of bone defect in the CT scan, unlikely osteomyelitis. Will see as an outpatient on Thursday and recommend to debride site. OMFS Dr. Elmore [phone]  -Blood cultures negative    #HTN  Well controlled, now 121/61  -Nadolol 20 mg QD     #DVT PPX  -SCD   -Pt ambulating around room    IMPROVE VTE Individual Risk Assessment    RISK                                                                Points    [  ] Previous VTE                                                  3    [  ] Thrombophilia                                               2    [  ] Lower limb paralysis                                      2        (unable to hold up >15 seconds)      [  ] Current Cancer                                              2         (within 6 months)    [  ] Immobilization > 24 hrs                                1    [  ] ICU/CCU stay > 24 hours                              1    [ x ] Age > 60                                                      1    IMPROVE VTE Score ____1_____    IMPROVE Score 0-1: Low Risk, No VTE prophylaxis required for most patients, encourage ambulation.   IMPROVE Score 2-3: At risk, pharmacologic VTE prophylaxis is indicated for most patients (in the absence of a contraindication)  IMPROVE Score > or = 4: High Risk, pharmacologic VTE prophylaxis is indicated for most patients (in the absence of a contraindication)    #CODE Status  -Full code    #D/C Planning  Pt D/C, will F/U w OMFS & PCP as outpt    Discussed w Dr. Ford
Left mandibular infection => most likely failed implant  as source of bone defect in the CT scan, unlikely osteomyelitis.  Pt afebrile and improving on antibiotics.    Discussed findings with Dr Ford's residents and Dr Putnam (ID) and agreed to give one more day of IV antibiotics and D/C home tomorrow on PO Clindamycin.  Will see as an outpatient on Thursday and reccomend to debride site.

## 2020-08-26 NOTE — PROGRESS NOTE ADULT - SUBJECTIVE AND OBJECTIVE BOX
64 yo F with PMH of HTN presents to ED on 08/24/20 c/o left jaw pain associated w swelling. She reports recent dental implant (7/2020) which failed on 3 attempts, after which she experienced left jaw numbness. She went to her periodontist the week prior to arrival and was placed on a 5 day course of azithromycin which she completed on 8/19/2020. After completing this course, pt went to Urgent Care on 8/21/2020 and was given doxycyline and CT scan was completed. Urgent care followed up with patient and recommended patient come to the ED regarding CT scan findings with concern for mandibular osteomyelitis. CT scan mandible w/ IV cont- bone erosion and periosteal reaction within horizontal ramus of left mandible in region of molar extraction socket.     08/25/20: Pt seen at bedside, in good spirits. Denies any current jaw pain, jaw numbness, sore throat, HA or malaise.     Vitals  T(F): 98.7 (08-25-20 @ 15:36), Max: 98.7 (08-25-20 @ 15:36)  HR: 63 (08-25-20 @ 15:36) (59 - 69)  BP: 116/54 (08-25-20 @ 15:36) (116/54 - 185/78)  RR: 17 (08-25-20 @ 15:36) (17 - 18)  SpO2: 98% (08-25-20 @ 15:36) (97% - 100%)    Physical Exam   Gen: NAD, comfortable  HENT: slight edema of left mandible, atraumatic head and ears, no gross abnormalities of ears, mucous membranes moist, no oral lesions, neck supple without masses/goiter/lymphadenopathy  CV: RRR, nl s1/s2, no M/R/G  Pulm: nl respiratory effort, CTAB, no wheezes/crackles/rhonchi  Back: no scoliosis, lordosis, or kyphosis  Abd: normoactive bowel sounds in all 4 quadrants, soft, nontender, nondistended, no rebound, no guarding, no masses  Skin: nl warm and dry, no wounds   Neuro: A&Ox3, answering questions appropriately      LABS:  cret                        12.9   8.77  )-----------( 330      ( 24 Aug 2020 18:41 )             40.4     08-25    141  |  108  |  18  ----------------------------<  107<H>  4.0   |  28  |  0.67    Ca    9.6      25 Aug 2020 10:15  Phos  3.1     08-25    TPro  7.9  /  Alb  3.5  /  TBili  0.9  /  DBili  x   /  AST  24  /  ALT  31  /  AlkPhos  87  08-25      MEDICATIONS  (STANDING):  clindamycin IVPB 900 milliGRAM(s) IV Intermittent every 8 hours  escitalopram 10 milliGRAM(s) Oral daily  lactobacillus acidophilus 1 Tablet(s) Oral daily  nadolol 20 milliGRAM(s) Oral daily  sodium chloride 0.9%. 1000 milliLiter(s) (100 mL/Hr) IV Continuous <Continuous>    MEDICATIONS  (PRN):  acetaminophen   Tablet .. 650 milliGRAM(s) Oral every 6 hours PRN Mild Pain (1 - 3)        < from: CT Mandible w/ IV Cont (08.21.20 @ 15:53) >  IMPRESSION:  Bone erosion and periosteal reaction within the horizontal ramus of the mandible on the left in the region of an unhealed molar extraction socket. Osteomyelitis is a prime consideration however osteonecrosis or less likely tumor infiltration are possibilities.    Soft tissue fullness within the nasopharynx and tongue base likely representing residual lymphoid tissue. Correlation with direct visualization is advised as clinically warranted.    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 No evidence forabscess.    < end of copied text >
66 yo F with PMH of HTN presents to ED on 08/24/20 c/o left jaw pain associated w swelling. She reports recent dental implant (7/2020) which failed on 3 attempts, after which she experienced left jaw numbness. She went to her periodontist the week prior to arrival and was placed on a 5 day course of azithromycin which she completed on 8/19/2020. After completing this course, pt went to Urgent Care on 8/21/2020 and was given doxycyline and CT scan was completed. Urgent care followed up with patient and recommended patient come to the ED regarding CT scan findings with concern for mandibular osteomyelitis. CT scan mandible w/ IV cont- bone erosion and periosteal reaction within horizontal ramus of left mandible in region of molar extraction socket. Admitted and treated for cellulitis & mandible pain/bone erosion secondary to failed dental implants.     08/26/20: Pt seen at bedside. States left mandibular pain is improving. Denies fevers, chest pain, SOB, loss of taste or cough.     Vitals  T(F): 97.7 (08-26-20 @ 07:53), Max: 98.7 (08-25-20 @ 15:36)  HR: 58 (08-26-20 @ 07:53) (58 - 65)  BP: 121/61 (08-26-20 @ 07:53) (105/37 - 137/47)  RR: 17 (08-26-20 @ 07:53) (17 - 18)  SpO2: 99% (08-26-20 @ 07:53) (96% - 99%)    Physical Exam   Gen: NAD, comfortable  HENT: atraumatic head and ears, no gross abnormalities of ears, mucous membranes moist, no oral lesions, neck supple without masses/goiter/lymphadenopathy  CV: RRR, nl s1/s2, no M/R/G  Pulm: nl respiratory effort, CTAB, no wheezes/crackles/rhonchi  Abd: normoactive bowel sounds in all 4 quadrants, soft, nontender, nondistended, no rebound, no guarding, no masses  Extremities: no pedal edema, pedal pulses palpable   Skin: nl warm and dry, no wounds   Neuro: A&Ox3, answering questions appropriately      LABS:  cret                        12.4   4.71  )-----------( 284      ( 26 Aug 2020 09:59 )             40.2     08-25    141  |  108  |  18  ----------------------------<  107<H>  4.0   |  28  |  0.67    Ca    9.6      25 Aug 2020 10:15  Phos  3.1     08-25    TPro  7.9  /  Alb  3.5  /  TBili  0.9  /  DBili  x   /  AST  24  /  ALT  31  /  AlkPhos  87  08-25      MEDICATIONS  (STANDING):  clindamycin IVPB 900 milliGRAM(s) IV Intermittent every 8 hours  escitalopram 10 milliGRAM(s) Oral daily  lactobacillus acidophilus 1 Tablet(s) Oral daily  nadolol 20 milliGRAM(s) Oral daily  sodium chloride 0.9%. 1000 milliLiter(s) (100 mL/Hr) IV Continuous <Continuous>    MEDICATIONS  (PRN):  acetaminophen   Tablet .. 650 milliGRAM(s) Oral every 6 hours PRN Mild Pain (1 - 3)
Date of service: 08-26-20 @ 08:57    Her left upper neck and face is improving  No pain    ROS: no fever or chills; denies dizziness, no HA, no SOB or cough, no abdominal pain, no diarrhea or constipation; no dysuria, no legs pain    MEDICATIONS  (STANDING):  clindamycin IVPB 900 milliGRAM(s) IV Intermittent every 8 hours  escitalopram 10 milliGRAM(s) Oral daily  lactobacillus acidophilus 1 Tablet(s) Oral daily  nadolol 20 milliGRAM(s) Oral daily  sodium chloride 0.9%. 1000 milliLiter(s) (100 mL/Hr) IV Continuous <Continuous>    Vital Signs Last 24 Hrs  T(C): 36.5 (26 Aug 2020 07:53), Max: 37.1 (25 Aug 2020 15:36)  T(F): 97.7 (26 Aug 2020 07:53), Max: 98.7 (25 Aug 2020 15:36)  HR: 58 (26 Aug 2020 07:53) (58 - 65)  BP: 121/61 (26 Aug 2020 07:53) (105/37 - 137/47)  BP(mean): --  RR: 17 (26 Aug 2020 07:53) (17 - 18)  SpO2: 99% (26 Aug 2020 07:53) (96% - 99%)     Physical exam:    Constitutional:  No acute distress  HEENT: NC/AT, EOMI, PERRLA, conjunctivae clear; pharynx benign; left mandibuar tenderness improving  Neck: supple; thyroid not palpable  Back: no tenderness  Respiratory: respiratory effort normal; clear to auscultation  Cardiovascular: S1S2 regular, no murmurs  Abdomen: soft, not tender, not distended, positive BS;  Genitourinary: no suprapubic tenderness  Lymphatic: no LN palpable  Musculoskeletal: no muscle tenderness, no joint swelling or tenderness  Extremities: no pedal edema  Neurological/ Psychiatric: AxOx3, moving all extremities  Skin: no rashes; no palpable lesions    Labs: reviewed                        12.9   8.77  )-----------( 330      ( 24 Aug 2020 18:41 )             40.4     08-25    141  |  108  |  18  ----------------------------<  107<H>  4.0   |  28  |  0.67    Ca    9.6      25 Aug 2020 10:15  Phos  3.1     08-25    TPro  7.9  /  Alb  3.5  /  TBili  0.9  /  DBili  x   /  AST  24  /  ALT  31  /  AlkPhos  87  08-25    C-Reactive Protein, Serum: 0.54 mg/dL (08-24-20 @ 18:41)                        12.9   8.77  )-----------( 330      ( 24 Aug 2020 18:41 )             40.4     08-24    138  |  106  |  21  ----------------------------<  101<H>  4.0   |  29  |  0.83    Ca    10.3<H>      24 Aug 2020 18:41    TPro  8.6<H>  /  Alb  4.1  /  TBili  0.3  /  DBili  x   /  AST  20  /  ALT  28  /  AlkPhos  91  08-24     LIVER FUNCTIONS - ( 24 Aug 2020 18:41 )  Alb: 4.1 g/dL / Pro: 8.6 gm/dL / ALK PHOS: 91 U/L / ALT: 28 U/L / AST: 20 U/L / GGT: x             COVID-19 PCR: NotDetec (08-24-20 @ 21:58)    Radiology: all available radiological tests reviewed    < from: CT Mandible w/ IV Cont (08.21.20 @ 15:53) >  Bone erosion and periosteal reaction within the horizontal ramus of the mandible on the left in the region of an unhealed molar extraction socket. Osteomyelitis is a prime consideration however osteonecrosis or less likely tumor infiltration are possibilities.  Soft tissue fullness within the nasopharynx and tongue base likely representing residual lymphoid tissue.     < end of copied text >      Advanced directives addressed: full resuscitation
" I have jaw pain"    HPI: Pt reports that the issues with the left lower jaw started 1-1/2 ago. She had a bridge in that area that failed and teeth needed to get extracted. She had 2 implants placed by a periodontist, Dr Galeano, to replace the teeth and get another bridge. The most anterior implant integrated well, but the most posterior implant failed.   Dr. Galeano placed another implant 6 months ago, that also failed. Then in July this year, he attempted again to placed another implant and it failed as well. Last week she went to an urgent center, who referred for CT scan and prescribed doxicycline. She was called about CT scan findings and told to come to hospital for care. She was admitted yesterday and had a MRI and OMFS was called to evaluate for osteomyelitis.  Patient denies trismus, odenophagia, dysphagia or fevers. She reports that swelling has decreased but left lower jaw area  to touch. The existing implant does not hurt but the area behind is tender.    PE:  + slight swelling on left anterior mandible, tender to touch, No erythema, No warmth  NO trismus  + #21 area with implant and HA, not movable  + area #19,20 with tenderness on vestibule, minimal swelling, no erythema  NO swelling on Floor of the mouth  + good dentition    CT reviewed: Bone defect in area #19/20 mostly toward the lingual area, no abscess appreciated  MRI reviewed but not diagnostic.    T: 98  WBC: 8.9

## 2020-08-26 NOTE — DISCHARGE NOTE NURSING/CASE MANAGEMENT/SOCIAL WORK - PATIENT PORTAL LINK FT
You can access the FollowMyHealth Patient Portal offered by [facility] by registering at the following website: [email or web address]. By joining [employer]’s FollowMyHealth portal, you will also be able to view your health information using other applications (apps) compatible with our system.

## 2020-08-30 LAB
CULTURE RESULTS: SIGNIFICANT CHANGE UP
CULTURE RESULTS: SIGNIFICANT CHANGE UP
SPECIMEN SOURCE: SIGNIFICANT CHANGE UP
SPECIMEN SOURCE: SIGNIFICANT CHANGE UP

## 2020-09-09 DIAGNOSIS — Z88.0 ALLERGY STATUS TO PENICILLIN: ICD-10-CM

## 2020-09-09 DIAGNOSIS — L03.211 CELLULITIS OF FACE: ICD-10-CM

## 2020-09-09 DIAGNOSIS — Z79.2 LONG TERM (CURRENT) USE OF ANTIBIOTICS: ICD-10-CM
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
## 2020-09-09 DIAGNOSIS — I10 ESSENTIAL (PRIMARY) HYPERTENSION: ICD-10-CM

## 2020-09-09 DIAGNOSIS — Z88.2 ALLERGY STATUS TO SULFONAMIDES: ICD-10-CM
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
## 2020-09-09 DIAGNOSIS — M27.2 INFLAMMATORY CONDITIONS OF JAWS: ICD-10-CM

## 2020-09-09 DIAGNOSIS — M27.62 POST-OSSEOINTEGRATION BIOLOGICAL FAILURE OF DENTAL IMPLANT: ICD-10-CM

## 2020-11-09 NOTE — H&P ADULT - RS GEN PE MLT RESP DETAILS PC
Pharm stress test completed with physician, RN, nuclear medicine staff and patient wearing procedure masks. clear to auscultation bilaterally/airway patent/normal/good air movement/breath sounds equal/respirations non-labored

## 2021-01-04 ENCOUNTER — TRANSCRIPTION ENCOUNTER (OUTPATIENT)
Age: 66
End: 2021-01-04

## 2021-03-03 NOTE — PATIENT PROFILE ADULT - NSPROIMPLANTSMEDDEV_GEN_A_NUR
Dental implant bilateral Griseofulvin Counseling:  I discussed with the patient the risks of griseofulvin including but not limited to photosensitivity, cytopenia, liver damage, nausea/vomiting and severe allergy.  The patient understands that this medication is best absorbed when taken with a fatty meal (e.g., ice cream or french fries).

## 2021-08-09 ENCOUNTER — TRANSCRIPTION ENCOUNTER (OUTPATIENT)
Age: 66
End: 2021-08-09

## 2022-06-04 ENCOUNTER — EMERGENCY (EMERGENCY)
Facility: HOSPITAL | Age: 67
LOS: 1 days | Discharge: HOME | End: 2022-06-04
Attending: EMERGENCY MEDICINE | Admitting: EMERGENCY MEDICINE
Payer: MEDICARE

## 2022-06-04 VITALS
RESPIRATION RATE: 16 BRPM | HEIGHT: 61 IN | DIASTOLIC BLOOD PRESSURE: 83 MMHG | OXYGEN SATURATION: 98 % | HEART RATE: 62 BPM | TEMPERATURE: 98 F | WEIGHT: 167.55 LBS | SYSTOLIC BLOOD PRESSURE: 163 MMHG

## 2022-06-04 DIAGNOSIS — Z90.49 ACQUIRED ABSENCE OF OTHER SPECIFIED PARTS OF DIGESTIVE TRACT: Chronic | ICD-10-CM

## 2022-06-04 PROBLEM — I10 ESSENTIAL (PRIMARY) HYPERTENSION: Chronic | Status: ACTIVE | Noted: 2020-08-25

## 2022-06-04 PROCEDURE — 70450 CT HEAD/BRAIN W/O DYE: CPT | Mod: MA

## 2022-06-04 PROCEDURE — 72125 CT NECK SPINE W/O DYE: CPT | Mod: MA

## 2022-06-04 PROCEDURE — 72125 CT NECK SPINE W/O DYE: CPT | Mod: 26,MA

## 2022-06-04 PROCEDURE — 99284 EMERGENCY DEPT VISIT MOD MDM: CPT

## 2022-06-04 PROCEDURE — 99284 EMERGENCY DEPT VISIT MOD MDM: CPT | Mod: 25

## 2022-06-04 PROCEDURE — 70450 CT HEAD/BRAIN W/O DYE: CPT | Mod: 26,MA

## 2022-06-04 RX ORDER — NADOLOL 80 MG/1
1 TABLET ORAL
Qty: 0 | Refills: 0 | DISCHARGE

## 2022-06-04 RX ORDER — ESCITALOPRAM OXALATE 10 MG/1
1 TABLET, FILM COATED ORAL
Qty: 0 | Refills: 0 | DISCHARGE

## 2022-06-04 NOTE — ED ADULT NURSE NOTE - OBJECTIVE STATEMENT
patient is gardening and fell backward hit her head, no loc or bleeding outside noted, no blood thinner, will continue to monitor.

## 2022-06-04 NOTE — ED PROVIDER NOTE - PATIENT PORTAL LINK FT
You can access the FollowMyHealth Patient Portal offered by [facility] by registering at the following website: [email or web address]. By joining [facility]’s FollowMyHealth portal, you will also be able to view your health information using other applications (apps) compatible with our system.

## 2022-06-04 NOTE — ED PROVIDER NOTE - OBJECTIVE STATEMENT
pt c/o headache s/p head injury. pt relates was gardening fell backwards hitting head on pavers while trying to pull weeds. no loc, neck or back pain, cp, sob, abd pain, arm or leg pain, d/n/v, or any other inj. pt declining pain meds.  pmd - cyran

## 2022-06-04 NOTE — ED PROVIDER NOTE - CARE PROVIDER_API CALL
Karson Daniels (DO)  Internal Medicine  [address], [address]  [place] [address]  Phone: [phone]  Fax: [phone]  Follow Up Time: 1-3 Days

## 2022-06-04 NOTE — ED ADULT NURSE NOTE - NSIMPLEMENTINTERV_GEN_ALL_ED
Implemented All Fall with Harm Risk Interventions:  [place] to call system. Call bell, personal items and telephone within reach. Instruct patient to call for assistance. Room bathroom lighting operational. Non-slip footwear when patient is off stretcher. Physically safe environment: no spills, clutter or unnecessary equipment. Stretcher in lowest position, wheels locked, appropriate side rails in place. Provide visual cue, wrist band, yellow gown, etc. Monitor gait and stability. Monitor for mental status changes and reorient to person, place, and time. Review medications for side effects contributing to fall risk. Reinforce activity limits and safety measures with patient and family. Provide visual clues: red socks.

## 2022-06-04 NOTE — ED ADULT TRIAGE NOTE - CHIEF COMPLAINT QUOTE
" I was pulling out a plant in the garden and I fell backwards and hit the back of head onto the pavers " No LOC No blood thinners (+) Pain Left side Occipital region

## 2022-06-04 NOTE — ED PROVIDER NOTE - PROGRESS NOTE DETAILS
Reevaluated patient at bedside.  Patient feeling well.  Discussed the results of diagnostic testing in ED and copies of reports given.   An opportunity to ask questions was given.  Discussed the importance of prompt, close medical follow-up.  Patient will return with any changes, concerns or persistent / worsening symptoms.  Understanding of all instructions verbalized.

## 2022-06-04 NOTE — ED PROVIDER NOTE - CHPI ED SYMPTOMS NEG
no blurred vision/no dizziness/no loss of consciousness/no nausea/no vomiting/no weakness/no change in level of consciousness

## 2022-09-30 ENCOUNTER — APPOINTMENT (OUTPATIENT)
Dept: ORTHOPEDIC SURGERY | Facility: CLINIC | Age: 67
End: 2022-09-30

## 2022-09-30 VITALS — BODY MASS INDEX: 31.72 KG/M2 | WEIGHT: 168 LBS | HEIGHT: 61 IN

## 2022-09-30 DIAGNOSIS — I10 ESSENTIAL (PRIMARY) HYPERTENSION: ICD-10-CM

## 2022-09-30 PROCEDURE — J3490M: CUSTOM

## 2022-09-30 PROCEDURE — 20550 NJX 1 TENDON SHEATH/LIGAMENT: CPT

## 2022-09-30 PROCEDURE — 73130 X-RAY EXAM OF HAND: CPT | Mod: RT

## 2022-09-30 PROCEDURE — 99213 OFFICE O/P EST LOW 20 MIN: CPT | Mod: 25

## 2022-10-03 NOTE — PHYSICAL EXAM
[de-identified] : R hand: \par Mild swelling \par Tender 3rd A1 pulley \par Decreased middle ROM \par +middle triggering\par \par Xrays OA

## 2022-10-03 NOTE — HISTORY OF PRESENT ILLNESS
[4] : 4 [Dull/Aching] : dull/aching [Ice] : ice [de-identified] : ISABELLE CHAPARRO trigger [] : no [FreeTextEntry1] : R hand [FreeTextEntry5] : middle finger pain for last few weeks. 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

## 2022-12-01 ENCOUNTER — NON-APPOINTMENT (OUTPATIENT)
Age: 67
End: 2022-12-01

## 2022-12-02 NOTE — ED ADULT TRIAGE NOTE - BP NONINVASIVE DIASTOLIC (MM HG)
81 Render Risk Assessment In Note?: no Comment: Discussed ABCDE’s of melanoma and signs/symptoms of NMSC. Recommend FBSE every 1-2 years or sooner if concerns. Detail Level: Detailed Comment: Chronic, no prior treatments. BSA ~ 1% on scalp. Nail ridging, no evidence of PsA, will continue to monitor. Treatment options reviewed including topical steroids and phototherapy. Will first start topical fluocinonide 0.05 solution, counseling as below. FU in 6-8 weeks to ensure response to treatment.

## 2022-12-16 NOTE — ED ADULT NURSE NOTE - NS ED NURSE DISCH DISPOSITION
[address] [place] [address]  [phone]        Reason for Consultation/Chief Complaint: \"I have been having chest pain. \"  Consulted for chest pain and elevated troponin  Follows cards Dr. Justin Hernandez    History of Present Illness:    Stella Seay is a 78 y.o. patient who presented to [facility] ED 12/15/2022 with complaints of chest pain for 1 day. She has a PMH of Afib-on Eliquis, mild 1V CAD ([facility] 2003 with 20% LAD stenosis), DM, COVID 19, HTN, HLD and TB. Most recent ECHO 7/4/2018 EF=55%; grade IIDD; LA mod dilated; mild-mod TR; SPAP 33 mmHg. Most recent Ramiro-Myoview 7/4/2018 EF=66%; negative for ischemia. She reports c/o pain mainly in left shoulder and upper left chest. States she broke her shoulder in fall and has had pain and mobility issues with joint ever since. Also reports some \"fluttering\" symptoms. Pain if off/on and worse trying to raise left arm. No assoc symptoms. CXR was unremarkable. Initial EKG noted AFIB with controlled V rate; LBBB 92 bpm (no change 3/22). .  Repeat EKG at [facility] PCU noted AFIB with controlled rate 76 bpm; left axis deviation; LBBB. ED LABS: , K 4.7, BUN/Cr 30/1.3, Remi <0.01 x3, ALT 34, AST 22, Glucose 359, H/H 13.6/40.3, BNP not done. Patient with no c/o SOB, dizziness, edema, or orthopnea/PND. I have been asked to provide consultation regarding further management and testing. Past Medical History:   has a past medical history of Arthritis, CAD (coronary artery disease), Cancer of cheek ([facility] [place] [address] COVID-19, Diabetes mellitus ([facility] [address] Hyperlipidemia, Hypertension, Osteoporosis, Retrocalcaneal bursitis, Secondary osteoarthritis of left shoulder due to rotator cuff arthropathy, and TB (pulmonary tuberculosis). Surgical History:   has a past surgical history that includes shoulder surgery; Lung surgery; Cardiac catheterization; Cholecystectomy, laparoscopic (N/A, 5/1/13); and Throat surgery. Social History:   reports that she has never smoked.  She has never used smokeless tobacco. She reports that she does not drink alcohol and does not use drugs. Family History:  family history is negative for significant heart disease in parents. Home Medications:  Were reviewed and are listed in nursing record. and/or listed below  Prior to Admission medications    Medication Sig Start Date End Date Taking? Authorizing Provider   cetirizine (ZYRTEC) 10 MG chewable tablet Take 10 mg by mouth daily   Yes Historical Provider, MD   insulin aspart (NOVOLOG) 100 UNIT/ML injection vial Inject into the skin 3 times daily Ordered as SSI   Yes Historical Provider, MD   acetaminophen (TYLENOL) 325 MG tablet Take 650 mg by mouth every 6 hours as needed for Pain    Historical Provider, MD   brimonidine (ALPHAGAN) 0.2 % ophthalmic solution Place 1 drop into both eyes 2 times daily    Historical Provider, MD   busPIRone (BUSPAR) 5 MG tablet Take 5 mg by mouth 2 times daily    Historical Provider, MD   digoxin (LANOXIN) 125 MCG tablet Take 125 mcg by mouth four times a week Every Mon, Wed, Fri, Sat.     Historical Provider, MD   dorzolamide (TRUSOPT) 2 % ophthalmic solution Place 1 drop into both eyes 2 times daily at 0800 and 1400    Historical Provider, MD   bisacodyl (DULCOLAX) 10 MG suppository Place 10 mg rectally daily    Historical Provider, MD   latanoprost (XALATAN) 0.005 % ophthalmic solution Place 1 drop into both eyes daily    Historical Provider, MD   magnesium hydroxide (MILK OF MAGNESIA) 400 MG/5ML suspension Take 30 mLs by mouth daily as needed for Constipation    Historical Provider, MD   diclofenac sodium (VOLTAREN) 1 % GEL APPLY 4 G TOPICALLY 4 TIMES DAILY 12/29/20   Josr Brar DO   flecainide (TAMBOCOR) 50 MG tablet Take 50 mg by mouth 2 times daily Take 1/2 tablet twice daily    Historical Provider, MD   metFORMIN (GLUCOPHAGE) 500 MG tablet Take 500 mg by mouth 2 times daily (with meals)    Historical Provider, MD   famotidine (PEPCID) 10 MG tablet Take 1 tablet by mouth 2 times daily 11/17/20 12/15/22  [facility] Blood, [facility]   ELIQUIS 2.5 MG TABS tablet 2.5 mg 2 times daily  10/21/19   Historical Provider, MD   fenofibrate (TRICOR) 145 MG tablet 200 mg     Historical Provider, MD   atorvastatin (LIPITOR) 20 MG tablet Take 1 tablet by mouth nightly  Patient taking differently: Take 10 mg by mouth nightly 7/5/18   Sheron Milan MD   LORazepam (ATIVAN) 0.5 MG tablet Take 0.5 mg by mouth 2 times daily. Abdiel Carranza Historical Provider, MD   glimepiride (AMARYL) 4 MG tablet Take 4 mg by mouth daily as needed Take before breakfast for blood sugar greater than 200. Historical Provider, MD   esomeprazole Magnesium (NEXIUM) 20 MG PACK Take 20 mg by mouth daily    Historical Provider, MD   Multiple Vitamins-Minerals (THERAPEUTIC MULTIVITAMIN-MINERALS) tablet Take 1 tablet by mouth daily    Historical Provider, MD   B Complex Vitamins (B COMPLEX 100 PO) Take by mouth    Historical Provider, MD   TRUETEST TEST strip  7/29/15   Historical Provider, MD   MAG-G 500 (27 MG) MG TABS tablet 400 mg  8/4/15   Historical Provider, MD   KLOR-CON 10 10 MEQ tablet  7/14/15   Historical Provider, MD   vitamin B-12 (CYANOCOBALAMIN) 1000 MCG tablet Take 1,000 mcg by mouth daily. Historical Provider, MD        Allergies:   Other, Penicillins, Polysporin [bacitracin-polymyxin b], and Adhesive tape     Review of Systems:   12 point ROS negative in all areas as listed below except as in [place]  Constitutional, EENT, Cardiovascular, pulmonary, GI, , Musculoskeletal, skin, neurological, hematological, endocrine, Psychiatric    Physical Examination:    Vitals:    12/16/22 0045   BP: (!) 138/94   Pulse: 83   Resp: 16   Temp: 97.5 °F (36.4 °C)   SpO2: 95%    Weight: 114 lb 14.4 oz (52.1 kg)         General Appearance:  Alert, cooperative, no distress, appears stated age   Head:  Normocephalic, without obvious abnormality, atraumatic   Eyes:  PERRL, conjunctiva/corneas clear       Nose: Nares normal, no drainage or sinus tenderness   Throat: Lips, mucosa, and tongue normal   Neck: Supple, symmetrical, trachea midline, no adenopathy, thyroid: not enlarged, symmetric, no tenderness/mass/nodules, no carotid bruit or JVD       Lungs:   Clear to auscultation bilaterally, respirations unlabored   Chest Wall:  No tenderness or deformity   Heart:  +irregularly irregular; S1, S2 normal, no murmur, rub or gallop   Abdomen:   Soft, non-tender, bowel sounds active all four quadrants,  no masses, no organomegaly           Extremities: Extremities normal, atraumatic, no cyanosis or edema   Pulses: 2+ and symmetric   Skin: Skin color, texture, turgor normal, no rashes or lesions   Pysch: Normal mood and affect   Neurologic: Normal gross motor and sensory exam.         Labs  CBC:   Lab Results   Component Value Date/Time    WBC 5.9 12/16/2022 04:37 AM    RBC 4.03 12/16/2022 04:37 AM    HGB 13.1 12/16/2022 04:37 AM    HCT 39.8 12/16/2022 04:37 AM    MCV 98.6 12/16/2022 04:37 AM    RDW 12.5 12/16/2022 04:37 AM     12/16/2022 04:37 AM     CMP:    Lab Results   Component Value Date/Time     12/16/2022 04:37 AM    K 4.2 12/16/2022 04:37 AM     12/16/2022 04:37 AM    CO2 21 12/16/2022 04:37 AM    BUN 26 12/16/2022 04:37 AM    CREATININE 1.0 12/16/2022 04:37 AM    GFRAA >60 03/03/2022 09:50 AM    GFRAA >60 05/01/2013 06:49 AM    AGRATIO 1.6 12/15/2022 12:15 PM    LABGLOM 53 12/16/2022 04:37 AM    GLUCOSE 122 12/16/2022 04:37 AM    PROT 7.0 12/15/2022 12:15 PM    CALCIUM 9.8 12/16/2022 04:37 AM    BILITOT 0.3 12/15/2022 12:15 PM    ALKPHOS 34 12/15/2022 12:15 PM    AST 22 12/15/2022 12:15 PM    ALT 12 12/15/2022 12:15 PM     PT/INR:  No results found for: PTINR  Lab Results   Component Value Date    CKTOTAL 41 11/09/2020    TROPONINI <0.01 12/16/2022       EKG:  I have reviewed EKG with the following interpretation:  Impression:  See HPI    Assessment:    Alfred Morales is a 78 y.o. patient who presented to [facility] ED 12/15/2022 with complaints of chest pain for 1 day. She has a PMH of Afib-on Eliquis, mild 1V CAD ([facility] 2003 with 20% LAD stenosis), DM, COVID 19, HTN, HLD and TB. Most recent ECHO 7/4/2018 EF=55%; grade IIDD; LA mod dilated; mild-mod TR; SPAP 33 mmHg. Most recent Ramiro-Myoview 7/4/2018 EF=66%; negative for ischemia. She reports c/o pain mainly in left shoulder and upper left chest. States she broke her shoulder in fall and has had pain and mobility issues with joint ever since. Also reports some \"fluttering\" symptoms. Pain if off/on and worse trying to raise left arm. No assoc symptoms. CXR was unremarkable. Initial EKG noted AFIB with controlled V rate; LBBB 92 bpm (no change 3/22). .  Repeat EKG at [facility] PCU noted AFIB with controlled rate 76 bpm; left axis deviation; LBBB. ED LABS: , K 4.7, BUN/Cr 30/1.3, Remi <0.01 x3, ALT 34, AST 22, Glucose 359, H/H 13.6/40.3, BNP not done. Diagnosis of unspecified chest pain and left shoulder pain in elderly female with hx mild 1V CAD and permanent afib with LBBB. Recs:  Ruled out for MI and EKG unremarkable without ischemic ST changes. Pain mainly left shoulder and upper left chest pain may be associated. ECHO already pending  Given permanent afib I am not sure why she is taking flecainide and 20 years ago she had mild CAD which is contraindication to flecainide. Would d/c flecainide and start Toprol XL 25mg daily for HR control of afib and HTN. OK for d/c from cardiac standpoint today if ECHO without concerning findings.   Case d/w IM doc      Patient Active Problem List   Diagnosis    Contusion of shoulder region    Incomplete tear of left rotator cuff    Hip strain, right, initial encounter    Contusion of hip    Closed fracture of head of radius    Contusion of elbow    Carpal tunnel syndrome    Enthesopathy of hip    Adhesive capsulitis of shoulder    Chest discomfort    HTN (hypertension)    HLD (hyperlipidemia)    GERD (gastroesophageal reflux disease)    Anxiety    Syncope and collapse    Angina pectoris (HCC)    Retrocalcaneal bursitis    Tendonitis, Achilles, right    Right Achilles tendinitis    Cancer of cheek (HCC)    Chest pain    Trochanteric bursitis of left hip    Osteoporosis    Sprain of left hip    Secondary osteoarthritis of left shoulder due to rotator cuff arthropathy    Major neurocognitive disorder (HCC)    Diabetes mellitus ([facility] [address]    Transaminitis    CAD (coronary artery disease)    Visual hallucinations    Traumatic incomplete tear of left rotator cuff    Community acquired pneumonia of right lower lobe of lung    Pneumonia due to COVID-19 virus    COVID-19    Elevated LFTs    Failure to thrive in adult         Thank you for allowing to us to participate in the [employer]. Further evaluation will be based upon the patient's clinical course and testing results. Admitted

## 2023-02-03 ENCOUNTER — APPOINTMENT (OUTPATIENT)
Dept: ORTHOPEDIC SURGERY | Facility: CLINIC | Age: 68
End: 2023-02-03
Payer: MEDICARE

## 2023-02-03 VITALS — HEIGHT: 61 IN | BODY MASS INDEX: 31.72 KG/M2 | WEIGHT: 168 LBS

## 2023-02-03 PROCEDURE — 99213 OFFICE O/P EST LOW 20 MIN: CPT | Mod: 25

## 2023-02-03 PROCEDURE — 20550 NJX 1 TENDON SHEATH/LIGAMENT: CPT | Mod: RT

## 2023-02-03 PROCEDURE — J3490N: CUSTOM | Mod: NC

## 2023-02-03 NOTE — HISTORY OF PRESENT ILLNESS
[6] : 6 [4] : 4 [Burning] : burning [Dull/Aching] : dull/aching [de-identified] : R MF trigger \par Injection lasted 4 weeks  [FreeTextEntry1] :  right MF [FreeTextEntry5] : pain is worse\par  [de-identified] : no

## 2023-02-03 NOTE — PHYSICAL EXAM
[de-identified] : R hand: \par Mild swelling \par Tender 3rd A1 pulley \par Decreased middle ROM \par +middle triggering\par \par Xrays OA

## 2023-02-03 NOTE — ASSESSMENT
[FreeTextEntry1] : R MF Trigger finger injection was performed because of pain inflammation and stiffness\par Anesthesia: ethyl chloride sprayed topically\par Celestone: An injection of Celestone 1cc\par Lidocaine: An injection of Lidocaine 1% 1cc\par Marcaine: An injection of Marcaine 0.5% 1cc\par \par Patient has tried OTC's including aspirin, Ibuprofen, Aleve etc or prescription NSAIDS, and/or exercises at home and/ or\par physical therapy without satisfactory response.\par After verbal consent using sterile preparation and technique. The risks, benefits, and alternatives to cortisone injection\par were explained in full to the patient. Risks outlined include but are not limited to infection, sepsis, bleeding, scarring, skin\par discoloration, temporary increase in pain, syncopal episode, failure to resolve symptoms, allergic reaction, symptom\par recurrence, and elevation of blood sugar in diabetics. Patient understood the risks. All questions were answered. After\par discussion of options, patient requested an injection. Oral informed consent was obtained and sterile prep was done of the\par injection site. Sterile technique was utilized for the procedure including the preparation of the solutions used for the\par injection. Patient tolerated the procedure well. Advised to ice the injection site this evening.\par Prep with betadine locally to site. Sterile technique used

## 2023-07-25 ENCOUNTER — APPOINTMENT (OUTPATIENT)
Dept: ORTHOPEDIC SURGERY | Facility: CLINIC | Age: 68
End: 2023-07-25
Payer: MEDICARE

## 2023-07-25 VITALS — HEIGHT: 61 IN

## 2023-07-25 DIAGNOSIS — M72.2 PLANTAR FASCIAL FIBROMATOSIS: ICD-10-CM

## 2023-07-25 PROCEDURE — 73630 X-RAY EXAM OF FOOT: CPT | Mod: RT

## 2023-07-25 PROCEDURE — 99214 OFFICE O/P EST MOD 30 MIN: CPT

## 2023-07-25 NOTE — IMAGING
[de-identified] : RLE\par Inspection of the ankle, foot and lower leg is as follows: no abrasions or lacerations, no swelling, no\par erythema and no gross deformity\par Palpation of the ankle, foot and lower leg is as follows: Tenderness at plantar fascia insertion.\par Range of motion of the ankle, foot and lower leg is as follows: dorsiflexion to 20 degrees, plantar flexion to 40 degrees,\par inversion to 30 degrees and eversion to 20 degrees. Strength testing of the ankle, foot and lower leg is as\par follows: Ankle dorsiflexion strength is 5/5, Ankle plantar flexion strength is 5/5, Ankle inversion strength is 5/5 and Ankle\par eversion strength is 5/5. Special Testing of the ankle, foot and lower leg are as follows: no pain with heel compression.\par Vascular testing of the ankle, foot and lower leg are as follows: Dorsalis Pedis (DP) Pulse is 2+.\par Sensation testing of the ankle, foot and lower leg are as follows: Sensation present to light touch in all distributions.\par  [Right] : right foot [There are no fractures, subluxations or dislocations. No significant abnormalities are seen] : There are no fractures, subluxations or dislocations. No significant abnormalities are seen

## 2023-07-25 NOTE — HISTORY OF PRESENT ILLNESS
[8] : 8 [1] : 2 [de-identified] : 07/25/2023:  2 weeks plantar heel pain. no injury. pain worst first in morning. no prior foot probs. no tx to date. denies dm/tob.  [] : no [FreeTextEntry1] : right foot

## 2023-07-28 ENCOUNTER — APPOINTMENT (OUTPATIENT)
Dept: ORTHOPEDIC SURGERY | Facility: CLINIC | Age: 68
End: 2023-07-28
Payer: MEDICARE

## 2023-07-28 VITALS — BODY MASS INDEX: 31.72 KG/M2 | HEIGHT: 61 IN | WEIGHT: 168 LBS

## 2023-07-28 DIAGNOSIS — M65.331 TRIGGER FINGER, RIGHT MIDDLE FINGER: ICD-10-CM

## 2023-07-28 PROCEDURE — 20550 NJX 1 TENDON SHEATH/LIGAMENT: CPT | Mod: RT

## 2023-07-28 PROCEDURE — 99213 OFFICE O/P EST LOW 20 MIN: CPT | Mod: 25

## 2023-07-28 PROCEDURE — J3490M: CUSTOM | Mod: NC

## 2023-07-28 NOTE — PHYSICAL EXAM
[de-identified] : R hand: \par Mild swelling \par Tender 3rd A1 pulley \par Decreased middle ROM \par +middle triggering\par \par Xrays OA

## 2023-07-28 NOTE — HISTORY OF PRESENT ILLNESS
[5] : 5 [3] : 3 [de-identified] : R MF trigger\par Last injection in Feb [FreeTextEntry1] : Middle Finger [FreeTextEntry5] : Pain increased.. Feels stiffness. Also a feels burning sensation at the base of the RT middle finger. Having trouble bending it.

## 2023-09-11 ENCOUNTER — NON-APPOINTMENT (OUTPATIENT)
Age: 68
End: 2023-09-11

## 2023-10-12 NOTE — CDI QUERY NOTE - NSCDINOTEDOCCLARIFICATION_GEN_A_CORE
Blood pressure is above goal.  Benazepril was stopped in the hospital due to angioedema. Currently on amlodipine 5 mg daily, doxazosin 1 mg daily, metoprolol tartrate 100 mg BID. Also on torsemide to 40 mg daily. Reviewed importance of CPAP compliance, 2 g sodium diet, smoking cessation, and weight control. Trial up-titration of amlodipine to 7.5 mg - will watch lower leg swelling. Labs now. PLEASE INCLUDE MORE SPECIFIC DOCUMENTATION IN YOUR PROGRESS NOTE AND DISCHARGE SUMMARY.  The documentation in this patient's medical record requires additional clarification to ensure that we accurately capture the patients diagnosis(es), treatment and/or severity of illness. Please document to the greatest level of specificity all corresponding diagnoses (either known or suspected) and/or treatment associated with the clinical information described below.

## 2024-01-20 ENCOUNTER — NON-APPOINTMENT (OUTPATIENT)
Age: 69
End: 2024-01-20

## 2024-09-27 ENCOUNTER — APPOINTMENT (OUTPATIENT)
Dept: ORTHOPEDIC SURGERY | Facility: CLINIC | Age: 69
End: 2024-09-27
Payer: MEDICARE

## 2024-09-27 VITALS — BODY MASS INDEX: 31.72 KG/M2 | HEIGHT: 61 IN | WEIGHT: 168 LBS

## 2024-09-27 DIAGNOSIS — M65.321 TRIGGER FINGER, RIGHT INDEX FINGER: ICD-10-CM

## 2024-09-27 DIAGNOSIS — Z78.9 OTHER SPECIFIED HEALTH STATUS: ICD-10-CM

## 2024-09-27 PROCEDURE — 99213 OFFICE O/P EST LOW 20 MIN: CPT | Mod: 25

## 2024-09-27 PROCEDURE — 20550 NJX 1 TENDON SHEATH/LIGAMENT: CPT | Mod: F6

## 2024-09-27 PROCEDURE — J3490M: CUSTOM | Mod: NC,JZ

## 2024-09-27 NOTE — ASSESSMENT
[FreeTextEntry1] : R IF tendon sheath injection was performed because of pain and inflammation Anesthesia: ethyl chloride sprayed topically Celestone 6mg: An injection of Celestone 1cc Lidocaine: An injection of Lidocaine 1% 1cc Marcaine: An injection of Marcaine 0.5% 1cc   The risks, benefits, and alternatives to cortisone injection were explained in full to the patient. Risks outlined include but are not limited to infection, sepsis, bleeding, scarring, skin discoloration, temporary increase in pain, syncopal episode, failure to resolve symptoms, allergic reaction, symptom recurrence, and elevation of blood sugar in diabetics. Patient understood the risks. All questions were answered. After discussion of options, patient verbally consented to an injection. Sterile prep was done of the injection site. Patient tolerated the procedure well. Advised to ice the injection site this evening.  Activity modification as tolerated Ice as needed NSAIDs as needed Return prn

## 2024-09-27 NOTE — PHYSICAL EXAM
[de-identified] : R hand:  Mild swelling  Tender 2nd A1 pulley  Decreased index ROM  +index triggering

## 2024-09-27 NOTE — HISTORY OF PRESENT ILLNESS
[6] : 6 [2] : 2 [Dull/Aching] : dull/aching [Nothing helps with pain getting better] : Nothing helps with pain getting better [de-identified] : R IF trigger  [FreeTextEntry1] : R IF  [FreeTextEntry5] : she has pain and finger is locking  [de-identified] : activity

## 2024-12-12 ENCOUNTER — NON-APPOINTMENT (OUTPATIENT)
Age: 69
End: 2024-12-12

## 2025-01-09 NOTE — ED ADULT NURSE NOTE - DISTAL EXTREMITY CAPILLARY REFILL
Energy: 1445-1557kcal/day (using MSJ x 1.3-1.4) with consideration for age, MRSA infection  Protein: 73-79g/day (using 1.1-1.2g/kg) same reasons as above  Fluid: 1mL/kcal/day
2 seconds or less

## 2025-04-15 ENCOUNTER — NON-APPOINTMENT (OUTPATIENT)
Age: 70
End: 2025-04-15

## 2025-06-09 NOTE — REASON FOR VISIT
What Type Of Note Output Would You Prefer (Optional)?: Standard Output
Hpi Title: Evaluation of Skin Lesions
Additional History: Patient states she has a spot on her scalp.
[FreeTextEntry2] : F/U right MF

## 2025-06-20 ENCOUNTER — NON-APPOINTMENT (OUTPATIENT)
Age: 70
End: 2025-06-20

## 2025-06-24 ENCOUNTER — NON-APPOINTMENT (OUTPATIENT)
Age: 70
End: 2025-06-24